# Patient Record
Sex: MALE | Race: WHITE | NOT HISPANIC OR LATINO | URBAN - METROPOLITAN AREA
[De-identification: names, ages, dates, MRNs, and addresses within clinical notes are randomized per-mention and may not be internally consistent; named-entity substitution may affect disease eponyms.]

---
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
## 2021-06-04 VITALS
WEIGHT: 167 LBS | SYSTOLIC BLOOD PRESSURE: 100 MMHG | HEIGHT: 72 IN | HEART RATE: 60 BPM | DIASTOLIC BLOOD PRESSURE: 60 MMHG | BODY MASS INDEX: 22.62 KG/M2 | TEMPERATURE: 97.7 F | RESPIRATION RATE: 12 BRPM

## 2021-06-06 NOTE — PROGRESS NOTES
"  Chief Complaint   Patient presents with     std check         HPI:   Preet Springer is a 19 y.o. male c/o needle pain in penis for the last month.  Happens 5-6 times a day.  No dysuria. Doesn't feel like he is getting his bladder emptied completely. No blood in urine.  No penile discharge.  No known exposure.  Has had multiple sexual partners..      ROS:  A 10 point comprehensive review of systems was negative except as noted.     Medications:  No current outpatient medications on file prior to visit.     No current facility-administered medications on file prior to visit.          Social History:  Social History     Tobacco Use     Smoking status: Former Smoker     Smokeless tobacco: Former User   Substance Use Topics     Alcohol use: Not on file         Physical Exam:   Vitals:    02/12/20 1059   BP: 100/60   Pulse: 60   Resp: 12   Temp: 97.7  F (36.5  C)   TempSrc: Oral   Weight: 167 lb (75.8 kg)   Height: 5' 11.65\" (1.82 m)       GEN:  NAD        Assessment/Plan:    1. Penile pain  Chlamydia trachomatis & Neisseria gonorrhoeae, Amplified Detection   2. Screen for STD (sexually transmitted disease)  Chlamydia trachomatis & Neisseria gonorrhoeae, Amplified Detection    Hepatitis B Surface Antigen (HBsAG)    Hepatitis C Antibody (Anti-HCV)    HIV Antigen/Antibody Screening [place]    Syphilis Screen, [facility]        Discussed not classic symptoms for STD.  Tests ordered as above.  Will treat as indicated.  If symptoms continue, need recheck.          Chris Bacon MD      2/12/2020    The following portions of the patient's history were reviewed and updated as appropriate: allergies, current medications, past family history, past medical history, past social history, past surgical history and problem list.      "

## 2021-06-20 NOTE — LETTER
Letter by Chris Bacon MD at      Author: Chris Bacon MD Service: -- Author Type: --    Filed:  Encounter Date: 2/14/2020 Status: (Other)         Preet Springer  [id number] [address] [facility] [place] [address]             February 14, 2020         Dear Mr. Springer,    Below are the results from your recent visit:    Resulted Orders   Chlamydia trachomatis & Neisseria gonorrhoeae, Amplified Detection   Result Value Ref Range    Chlamydia trachomatis, Amplified Detection Negative Negative    Neisseria gonorrhoeae, Amplified Detection Negative Negative   Hepatitis B Surface Antigen (HBsAG)   Result Value Ref Range    Hepatitis B Surface Ag Negative Negative   Hepatitis C Antibody (Anti-HCV)   Result Value Ref Range    Hepatitis C Ab Negative Negative   HIV Antigen/Antibody Screening [facility]   Result Value Ref Range    HIV Antigen / Antibody Negative Negative    Narrative    Method is Abbott HIV Ag/Ab for the detection of HIV p24 antigen, HIV-1 antibodies and HIV-2 antibodies.   Syphilis Screen, [facility]   Result Value Ref Range    Treponema Antibody (Syphilis) Negative Negative       All tests are negative.        Sincerely,        Electronically signed by Chris Bacon MD

## 2025-02-21 ENCOUNTER — ANCILLARY PROCEDURE (OUTPATIENT)
Dept: GENERAL RADIOLOGY | Facility: CLINIC | Age: 25
End: 2025-02-21
Payer: COMMERCIAL

## 2025-02-21 DIAGNOSIS — R06.02 SHORTNESS OF BREATH: ICD-10-CM

## 2025-02-21 DIAGNOSIS — R07.89 CHEST WALL PAIN: ICD-10-CM

## 2025-02-21 PROCEDURE — 71046 X-RAY EXAM CHEST 2 VIEWS: CPT | Mod: TC | Performed by: RADIOLOGY
